# Patient Record
Sex: MALE | Race: WHITE | NOT HISPANIC OR LATINO | Employment: OTHER | URBAN - METROPOLITAN AREA
[De-identification: names, ages, dates, MRNs, and addresses within clinical notes are randomized per-mention and may not be internally consistent; named-entity substitution may affect disease eponyms.]

---

## 2024-11-11 ENCOUNTER — APPOINTMENT (OUTPATIENT)
Dept: RADIOLOGY | Facility: HOSPITAL | Age: 79
End: 2024-11-11
Payer: MEDICARE

## 2024-11-11 ENCOUNTER — HOSPITAL ENCOUNTER (EMERGENCY)
Facility: HOSPITAL | Age: 79
Discharge: HOME | End: 2024-11-12
Payer: MEDICARE
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
## 2024-11-11 DIAGNOSIS — S24.112A: ICD-10-CM

## 2024-11-11 DIAGNOSIS — S09.90XA CLOSED HEAD INJURY, INITIAL ENCOUNTER: ICD-10-CM

## 2024-11-11 PROCEDURE — 70450 CT HEAD/BRAIN W/O DYE: CPT | Performed by: RADIOLOGY

## 2024-11-11 PROCEDURE — 72125 CT NECK SPINE W/O DYE: CPT | Performed by: RADIOLOGY

## 2024-11-11 PROCEDURE — 2500000005 HC RX 250 GENERAL PHARMACY W/O HCPCS: Performed by: NURSE PRACTITIONER

## 2024-11-11 PROCEDURE — 72128 CT CHEST SPINE W/O DYE: CPT | Performed by: RADIOLOGY

## 2024-11-11 PROCEDURE — 70450 CT HEAD/BRAIN W/O DYE: CPT

## 2024-11-11 PROCEDURE — 2500000004 HC RX 250 GENERAL PHARMACY W/ HCPCS (ALT 636 FOR OP/ED): Performed by: NURSE PRACTITIONER

## 2024-11-11 PROCEDURE — 72131 CT LUMBAR SPINE W/O DYE: CPT

## 2024-11-11 PROCEDURE — 72125 CT NECK SPINE W/O DYE: CPT

## 2024-11-11 PROCEDURE — 99285 EMERGENCY DEPT VISIT HI MDM: CPT | Mod: 25

## 2024-11-11 PROCEDURE — 2500000001 HC RX 250 WO HCPCS SELF ADMINISTERED DRUGS (ALT 637 FOR MEDICARE OP): Performed by: NURSE PRACTITIONER

## 2024-11-11 PROCEDURE — 72128 CT CHEST SPINE W/O DYE: CPT

## 2024-11-11 PROCEDURE — 90471 IMMUNIZATION ADMIN: CPT | Performed by: NURSE PRACTITIONER

## 2024-11-11 PROCEDURE — 72131 CT LUMBAR SPINE W/O DYE: CPT | Performed by: RADIOLOGY

## 2024-11-11 PROCEDURE — 90715 TDAP VACCINE 7 YRS/> IM: CPT | Performed by: NURSE PRACTITIONER

## 2024-11-11 RX ORDER — OXYMETAZOLINE HCL 0.05 %
2 SPRAY, NON-AEROSOL (ML) NASAL ONCE
Status: COMPLETED | OUTPATIENT
Start: 2024-11-11 | End: 2024-11-11

## 2024-11-11 RX ORDER — ACETAMINOPHEN 325 MG/1
975 TABLET ORAL ONCE
Status: COMPLETED | OUTPATIENT
Start: 2024-11-11 | End: 2024-11-11

## 2024-11-11 RX ORDER — BACITRACIN ZINC 500 UNIT/G
OINTMENT IN PACKET (EA) TOPICAL ONCE
Status: COMPLETED | OUTPATIENT
Start: 2024-11-11 | End: 2024-11-11

## 2024-11-11 ASSESSMENT — LIFESTYLE VARIABLES
EVER FELT BAD OR GUILTY ABOUT YOUR DRINKING: NO
EVER HAD A DRINK FIRST THING IN THE MORNING TO STEADY YOUR NERVES TO GET RID OF A HANGOVER: NO
HAVE PEOPLE ANNOYED YOU BY CRITICIZING YOUR DRINKING: NO
HAVE YOU EVER FELT YOU SHOULD CUT DOWN ON YOUR DRINKING: NO
TOTAL SCORE: 0

## 2024-11-11 ASSESSMENT — COLUMBIA-SUICIDE SEVERITY RATING SCALE - C-SSRS
1. IN THE PAST MONTH, HAVE YOU WISHED YOU WERE DEAD OR WISHED YOU COULD GO TO SLEEP AND NOT WAKE UP?: NO
2. HAVE YOU ACTUALLY HAD ANY THOUGHTS OF KILLING YOURSELF?: NO
6. HAVE YOU EVER DONE ANYTHING, STARTED TO DO ANYTHING, OR PREPARED TO DO ANYTHING TO END YOUR LIFE?: NO

## 2024-11-11 ASSESSMENT — PAIN DESCRIPTION - FREQUENCY: FREQUENCY: CONSTANT/CONTINUOUS

## 2024-11-11 ASSESSMENT — PAIN DESCRIPTION - DESCRIPTORS
DESCRIPTORS_2: ACHING
DESCRIPTORS: HEADACHE

## 2024-11-11 ASSESSMENT — PAIN DESCRIPTION - PAIN TYPE: TYPE: ACUTE PAIN

## 2024-11-11 ASSESSMENT — PAIN - FUNCTIONAL ASSESSMENT: PAIN_FUNCTIONAL_ASSESSMENT: 0-10

## 2024-11-11 ASSESSMENT — PAIN SCALES - GENERAL
PAINLEVEL_OUTOF10: 2
PAINLEVEL_OUTOF10: 2

## 2024-11-11 ASSESSMENT — PAIN DESCRIPTION - LOCATION
LOCATION: HEAD
LOCATION_2: NECK

## 2024-11-12 ENCOUNTER — TELEPHONE (OUTPATIENT)
Dept: HEMATOLOGY/ONCOLOGY | Facility: HOSPITAL | Age: 79
End: 2024-11-12
Payer: MEDICARE

## 2024-11-12 VITALS
WEIGHT: 220 LBS | RESPIRATION RATE: 18 BRPM | DIASTOLIC BLOOD PRESSURE: 83 MMHG | HEART RATE: 93 BPM | SYSTOLIC BLOOD PRESSURE: 132 MMHG | HEIGHT: 71 IN | BODY MASS INDEX: 30.8 KG/M2 | OXYGEN SATURATION: 97 % | TEMPERATURE: 97.3 F

## 2024-11-12 NOTE — ED PROVIDER NOTES
HPI   Chief Complaint   Patient presents with    Fall     Pt tripped and fell from standing position hitting back of head  no LOC  on ASA only then something fell and hit his forehead small laceration bleeding controlled some neck pain placed in c-collar by lois        79-year-old male with past history of hypertension, diabetes presents the emergency department today for a fall with head injury.  Patient states earlier today he was c ambulating out in the garage when he tripped over a board and fell striking his head on the floor.  No syncope.  No chest pain or shortness of breath.  Did not lose consciousness.  Is not on any blood thinners.  Does take a baby aspirin once a day.  Did not take anything prior to arrival for his discomfort.  He is having some neck and upper back pain.  Denies any abdominal pain lower upper extremity discomfort.  No vision changes, nausea or vomiting.  No dizziness or.  He does have a slight headache with the neck pain.  No saddle anesthesia bowel or bladder incontinence.  Unsure when his last tetanus vaccination was.                          Carlos Coma Scale Score: 15                  Patient History   History reviewed. No pertinent past medical history.  History reviewed. No pertinent surgical history.  No family history on file.  Social History     Tobacco Use    Smoking status: Never    Smokeless tobacco: Never   Vaping Use    Vaping status: Never Used   Substance Use Topics    Alcohol use: Not Currently    Drug use: Never       Physical Exam   ED Triage Vitals [11/11/24 2004]   Temperature Heart Rate Respirations BP   36.3 °C (97.3 °F) 59 20 141/76      Pulse Ox Temp Source Heart Rate Source Patient Position   95 % Tympanic Monitor Sitting      BP Location FiO2 (%)     Left arm --       Physical Exam  Vitals and nursing note reviewed.   Constitutional:       General: He is not in acute distress.     Appearance: Normal appearance. He is not toxic-appearing.   HENT:      Head: No  raccoon eyes or Velarde's sign.        Comments: Small abrasion over the right eye with no active bleeding at this time.  Cleansed in the ED.  Bacitracin applied.     Right Ear: Tympanic membrane normal. No hemotympanum.      Left Ear: Tympanic membrane normal. No hemotympanum.      Mouth/Throat:      Mouth: Mucous membranes are moist.      Pharynx: Oropharynx is clear.   Eyes:      Extraocular Movements: Extraocular movements intact.      Pupils: Pupils are equal, round, and reactive to light.   Cardiovascular:      Rate and Rhythm: Normal rate and regular rhythm.      Pulses: Normal pulses.      Heart sounds: Normal heart sounds.   Pulmonary:      Effort: Pulmonary effort is normal.      Breath sounds: Normal breath sounds.   Abdominal:      General: Abdomen is flat. Bowel sounds are normal.      Palpations: Abdomen is soft.      Tenderness: There is no abdominal tenderness.   Musculoskeletal:         General: Normal range of motion.      Cervical back: Normal range of motion and neck supple. Bony tenderness present.      Thoracic back: Bony tenderness present.      Lumbar back: Normal.      Right lower leg: No edema.      Left lower leg: No edema.   Skin:     General: Skin is warm and dry.      Capillary Refill: Capillary refill takes less than 2 seconds.   Neurological:      General: No focal deficit present.      Mental Status: He is alert and oriented to person, place, and time.      GCS: GCS eye subscore is 4. GCS verbal subscore is 5. GCS motor subscore is 6.      Cranial Nerves: No cranial nerve deficit.      Sensory: No sensory deficit.      Motor: No weakness.      Coordination: Coordination is intact.      Gait: Gait is intact.      Deep Tendon Reflexes: Reflexes are normal and symmetric.   Psychiatric:         Mood and Affect: Mood normal.         Behavior: Behavior normal.         Judgment: Judgment normal.         Labs Reviewed - No data to display  Pain Management Panel           No data to display               CT head wo IV contrast   Final Result   No evidence of acute intracranial hemorrhage or depressed calvarial   fracture.        Cerebral atrophy and chronic periventricular white matter small   vessel ischemic change.        Chronic mucosal opacification of left frontal sinus, left ethmoid air   cells and left maxillary and sphenoid sinus.             MACRO:   None        Signed by: Hiren Jimenez 11/11/2024 10:26 PM   Dictation workstation:   [id number]      CT cervical spine wo IV contrast   Final Result   No evidence of acute fracture of the cervical spine.        Multilevel degenerative change of the cervical spine.        Straightening and mild reversal of the normal cervical lordosis which   may be secondary to patient positioning or muscle spasm        MACRO:   None        Signed by: Hiren Jimenez 11/11/2024 10:30 PM   Dictation workstation:   [id number]      CT thoracic spine wo IV contrast   Final Result   No acute fracture of the thoracic or lumbar spine.        Multilevel degenerative change of the thoracic and lumbar spine.        Nonspecific sclerotic lesion T5 and left 7th rib; clinical   correlation recommended if there is concern for metastatic disease in   the appropriate clinical setting.             MACRO:   None        Signed by: Hiren Jimenez 11/11/2024 10:42 PM   Dictation workstation:   [id number]      CT lumbar spine wo IV contrast   Final Result   No acute fracture of the thoracic or lumbar spine.        Multilevel degenerative change of the thoracic and lumbar spine.        Nonspecific sclerotic lesion T5 and left 7th rib; clinical   correlation recommended if there is concern for metastatic disease in   the appropriate clinical setting.             MACRO:   None        Signed by: Hiren Jimenez 11/11/2024 10:42 PM   Dictation workstation:   [id number]          ED Course & MDM   ED Course as of 11/11/24 2300   Mon Nov 11, 2024   2100 Patient is requesting some Afrin for his nasal  congestion which is chronic in nature [RB]      ED Course User Index  [RB] Anel Jaimes, APRN-CNP         Diagnoses as of 11/11/24 2300   Fall, initial encounter   Complete lesion at T5 level of thoracic spinal cord (Multi)   Closed head injury, initial encounter       Medical Decision Making  On initial valuation patient is well-appearing emergency department at this time.  Does have a small abrasion to the right frontal scalp.  Did cleanse it and bacitracin Dr. Shook dressing applied.  He was given Tylenol with improvement in his pain.  Patient has been maintained in a c-collar from the squad.  Was updated on his Boostrix.  CT of the head C-spine T-spine and L-spine are ordered.  No evidence of acute intracranial hemorrhage or depressed R fracture.  CT C-spine shows no evidence of acute fracture or cervical spine does have degenerative changes.  Thoracic spine shows no acute fracture of the thoracic or lumbar spine does have a sclerotic lesion of T5 and left seventh rib.  Patient is unaware of these results from the past.  Will set him up for diagnostic clinic for further evaluation however states he is feeling better at this time and would like to go home.  He is ambulatory at his baseline ambulation.  We are attempting to find patient a ride home at this time as his wife does not drive at night and he came by squad.  Nursing staff is aware.  I discussed return precautions outpatient follow-up with the patient he verbalized understanding of the plan has no further questions or concerns he will be discharged home in improved condition.        Procedure  Procedures      I discussed the differential, results and discharge plan with the patient and/or family/friend/caregiver if present.  I emphasized the importance of follow-up with the physician I referred them to in the timeframe recommended.  I explained reasons for the patient to return to the Emergency Department. Additional verbal discharge instructions  were also given and discussed with the patient to supplement those generated by the EMR. We also discussed medications that were prescribed (if any) including common side effects and interactions. The patient was advised to abstain from driving, operating heavy machinery or making significant decisions while taking medications such as opiates and muscle relaxers that may impair this. All questions were addressed.  They understand return precautions and discharge instructions. The patient and/or family/friend/caregiver expressed understanding.           Anel Jaimes, JOHN-KAITLYN  11/11/24 [id number]

## 2024-11-12 NOTE — TELEPHONE ENCOUNTER
Referral placed to [facility] by Anel Jaimes NP, [facility], for sclerotic osseous lesions, discovered incidentally on CT imaging yesterday. I called the patient to discuss his imaging results and the referral. No answer, I left  requesting return call to office. Diagnostic Clinic contact information provided.   JOHN Poole.KAITLYN  [facility]

## 2024-11-13 NOTE — TELEPHONE ENCOUNTER
11/13/2024 @ 1326:  Called patient in response to a message from our [facility] Scheduling Team.  Patient had called our [facility] Scheduling Team today, to schedule an appointment with our [facility], but requested to be scheduled at a location closer to home.  Voicemail message left, requesting call back to discuss patient's scheduling preferences and appointment options; our contact information was provided.    Update sent to JOHN Poole-KAITLYN.    Veena Bell RN  [facility] - RN Care Coordinator  Office:  [phone]

## 2024-11-18 ENCOUNTER — TELEMEDICINE (OUTPATIENT)
Dept: HEMATOLOGY/ONCOLOGY | Facility: HOSPITAL | Age: 79
End: 2024-11-18
Payer: MEDICARE

## 2024-11-18 DIAGNOSIS — M89.9 LYTIC LESION OF BONE ON X-RAY: Primary | ICD-10-CM

## 2024-11-18 DIAGNOSIS — R93.7 ABNORMAL FINDINGS ON DIAGNOSTIC IMAGING OF OTHER PARTS OF MUSCULOSKELETAL SYSTEM: ICD-10-CM

## 2024-11-18 PROCEDURE — 1036F TOBACCO NON-USER: CPT | Performed by: NURSE PRACTITIONER

## 2024-11-18 PROCEDURE — 99205 OFFICE O/P NEW HI 60 MIN: CPT | Performed by: NURSE PRACTITIONER

## 2024-11-18 RX ORDER — DIAZEPAM 2 MG/1
2 TABLET ORAL AS NEEDED
COMMUNITY

## 2024-11-18 RX ORDER — BUMETANIDE 0.5 MG/1
0.5 TABLET ORAL DAILY
COMMUNITY
Start: 2024-11-01

## 2024-11-18 RX ORDER — FAMOTIDINE 20 MG/1
20 TABLET, FILM COATED ORAL DAILY
COMMUNITY

## 2024-11-18 RX ORDER — PROPRANOLOL HYDROCHLORIDE 120 MG/1
120 CAPSULE, EXTENDED RELEASE ORAL DAILY
COMMUNITY

## 2024-11-18 RX ORDER — IPRATROPIUM BROMIDE 42 UG/1
2 SPRAY, METERED NASAL 3 TIMES DAILY
COMMUNITY
Start: 2024-04-28

## 2024-11-18 RX ORDER — CLINDAMYCIN HYDROCHLORIDE 150 MG/1
150 CAPSULE ORAL DAILY PRN
COMMUNITY
Start: 2024-01-30

## 2024-11-18 RX ORDER — ASPIRIN 325 MG
325 TABLET ORAL DAILY
COMMUNITY

## 2024-11-18 RX ORDER — LANOLIN ALCOHOL/MO/W.PET/CERES
500 CREAM (GRAM) TOPICAL NIGHTLY
COMMUNITY
Start: 2024-09-05

## 2024-11-18 RX ORDER — CHOLECALCIFEROL (VITAMIN D3) 25 MCG
1000 TABLET ORAL DAILY
COMMUNITY

## 2024-11-18 RX ORDER — LISINOPRIL 5 MG/1
5 TABLET ORAL DAILY
COMMUNITY

## 2024-11-18 RX ORDER — LOVASTATIN 40 MG/1
40 TABLET ORAL NIGHTLY
COMMUNITY
Start: 2024-11-01

## 2024-11-18 ASSESSMENT — ENCOUNTER SYMPTOMS
ABDOMINAL PAIN: 0
ENDOCRINE COMMENTS: DENIES NIGHT SWEATS
VOMITING: 0
SPEECH DIFFICULTY: 0
HEMATURIA: 0
BLOOD IN STOOL: 0
DIARRHEA: 0
LEG SWELLING: 1
SHORTNESS OF BREATH: 0
APPETITE CHANGE: 0
ARTHRALGIAS: 0
NAUSEA: 0
CHILLS: 0
COUGH: 1
TROUBLE SWALLOWING: 0
FATIGUE: 0
UNEXPECTED WEIGHT CHANGE: 0
FEVER: 0

## 2024-11-18 NOTE — PROGRESS NOTES
"[facility]  Diagnostic Clinic  New Patient Virtual Visit    Date of Service: 24      Patient Name: King Duvall   : 1945  MRN: [id number]   PCP: Kris Christianson MD   Referred by: Anel Jaimes NP    Problem: Sclerotic osseous lesions    HPI: King Duvall is a 79 y.o. year old male w/ PMH HTN, DM2, HPL, GERD, CHF, COPD, enlarged prostate & hydrocele who presents to [facility] with sclerotic bone lesions of T5 and left seventh rib, referral placed by Anel Jaimes NP, [place].     Mr. Duvall presented to  [facility] on 24 after falling in his garage and sustaining a head injury. He sustained a small abrasion to his right frontal scalp. CT imaging revealed no e/o of acute ICH or fracture, but CT thoracic spine did reveal nonspecific sclerotic lesion in T5 and in left 7th rib posteriorly. He was discharged home, to follow-up in the diagnostic clinic for outpatient work-up.    Mr. Duvall presents virtually via video to clinic today. He notes feeling overall well & in his usual state of health. He denies complaints today. He specifically denies fevers/chills, night sweats, unintentional weight loss, fatigue, and SHARMIN. Never smoker. No prior history of cancer. He follows w/ PCP Dr. Christianson locally in [place], not affiliated with . He denies recent PSA or colonoscopy.    History of tobacco use:   Never.  Denies ETOH or illicit drug use.    Personal history of malignancy:   None.    Family history of malignancy:   Brother - leukemia.    Health Maintenance:  Colonoscopy \"many years ago\", reportedly benign per pt.  No recent PSA test per pt.    PATHOLOGY:  N/A     IMAGING:    === 24 ===    CT LUMBAR SPINE WO IV CONTRAST    - Impression -  No acute fracture of the thoracic or lumbar spine.    Multilevel degenerative change of the thoracic and lumbar spine.    Nonspecific sclerotic lesion T5 and left 7th rib; clinical  correlation recommended if there is " concern for metastatic disease in  the appropriate clinical setting.      MACRO:  None    Signed by: Hiren Jimenez 11/11/2024 10:42 PM  Dictation workstation:   [id number]      LABS:  No prior lab work on file.    PAST MEDICAL HISTORY:  Past Medical History:   Diagnosis Date    Allergic     CHF (congestive heart failure)     COPD (chronic obstructive pulmonary disease) (Multi)     Coronary artery disease     Diabetes mellitus (Multi)     GERD (gastroesophageal reflux disease)     Hypercholesteremia     Hypertension        PAST SURGICAL HISTORY:  History reviewed. No pertinent surgical history.    SOCIAL HISTORY:  Social Connections: Not on file       FAMILY HISTORY:  Family History   Problem Relation Name Age of Onset    Leukemia Brother         MEDICATIONS:  Current Outpatient Medications on File Prior to Visit   Medication Sig Dispense Refill    bumetanide (Bumex) 0.5 mg tablet Take 1 tablet (0.5 mg) by mouth once daily. Takes 2 tabs orally on Thursday & Sunday.      clindamycin (Cleocin) 150 mg capsule Take 1 capsule (150 mg) by mouth once daily as needed (Take 4 tablets by mouth one hour prior to dental work.).      ipratropium (Atrovent) 42 mcg (0.06 %) nasal spray Administer 2 sprays into each nostril 3 times a day.      lovastatin (Mevacor) 40 mg tablet Take 1 tablet (40 mg) by mouth once daily at bedtime.      niacin 500 mg ER tablet Take 1 tablet (500 mg) by mouth once daily at bedtime.      aspirin 325 mg tablet Take 1 tablet (325 mg) by mouth once daily.      cholecalciferol (Vitamin D-3) 25 MCG (1000 UT) tablet Take 1 tablet (1,000 Units) by mouth once daily.      diazePAM (Valium) 2 mg tablet Take 1 tablet (2 mg) by mouth if needed. Take as directed      famotidine (Pepcid) 20 mg tablet Take 1 tablet (20 mg) by mouth once daily.      lisinopril 5 mg tablet Take 1 tablet (5 mg) by mouth once daily.      propranolol LA (Inderal LA) 120 mg 24 hr capsule Take 1 capsule (120 mg) by mouth once daily.        No current facility-administered medications on file prior to visit.         REVIEW OF SYSTEMS:  Review of Systems   Constitutional:  Negative for appetite change, chills, fatigue, fever and unexpected weight change.        +has intentionally lost >100 lbs since 2007   HENT:   Negative for trouble swallowing.    Respiratory:  Positive for cough. Negative for shortness of breath.         +nocturnal cough   Cardiovascular:  Positive for leg swelling. Negative for chest pain.        +chronic BLE edema, on diuretic   Gastrointestinal:  Negative for abdominal pain, blood in stool, diarrhea, nausea and vomiting.   Endocrine:        Denies night sweats   Genitourinary:  Negative for hematuria.    Musculoskeletal:  Negative for arthralgias and gait problem.   Neurological:  Negative for gait problem and speech difficulty.       OBJECTIVE:  There were no vitals taken for this visit.  Physical exam deferred due to virtual visit.    ASSESSMENT:  King Duvall is a 79 y.o. year old male w/ PMH HTN, DM2, HPL, GERD, CHF, COPD, enlarged prostate & hydrocele who presents to [facility] with sclerotic bone lesions of T5 and left seventh rib, of uncertain etiology.    PLAN:  1. Lytic lesion of bone on x-ray (Primary)  Unclear etiology, r/o underlying malignancy.  -- Lab work ordered, including PSA and MM labs.  -- I will call Mr. Duvall to review his lab results & arrange next steps.  -- All patient questions answered.    JOHN Poole.CNP  [facility]           Virtual or Telephone Consent    A telephone visit (audio only) between the patient (at the originating site) and the provider (at the distant site) was utilized to provide this telehealth service.   Verbal consent was requested and obtained from King Duvall on this date, 11/18/24 for a telehealth visit.

## 2024-11-21 ENCOUNTER — LAB (OUTPATIENT)
Dept: LAB | Facility: LAB | Age: 79
End: 2024-11-21
Payer: MEDICARE

## 2024-11-21 DIAGNOSIS — M89.9 LYTIC LESION OF BONE ON X-RAY: ICD-10-CM

## 2024-11-21 DIAGNOSIS — R93.7 ABNORMAL FINDINGS ON DIAGNOSTIC IMAGING OF OTHER PARTS OF MUSCULOSKELETAL SYSTEM: ICD-10-CM

## 2024-11-21 LAB
ALBUMIN SERPL BCP-MCNC: 3.9 G/DL (ref 3.4–5)
ALP SERPL-CCNC: 69 U/L (ref 33–136)
ALT SERPL W P-5'-P-CCNC: 16 U/L (ref 10–52)
ANION GAP SERPL CALC-SCNC: 8 MMOL/L (ref 10–20)
AST SERPL W P-5'-P-CCNC: 17 U/L (ref 9–39)
B2 MICROGLOB SERPL-MCNC: 4.5 MG/L (ref 0.7–2.2)
BASOPHILS # BLD AUTO: 0.06 X10*3/UL (ref 0–0.1)
BASOPHILS NFR BLD AUTO: 1.1 %
BILIRUB SERPL-MCNC: 1.1 MG/DL (ref 0–1.2)
BUN SERPL-MCNC: 15 MG/DL (ref 6–23)
CALCIUM SERPL-MCNC: 8.9 MG/DL (ref 8.6–10.3)
CHLORIDE SERPL-SCNC: 97 MMOL/L (ref 98–107)
CO2 SERPL-SCNC: 37 MMOL/L (ref 21–32)
CREAT SERPL-MCNC: 0.67 MG/DL (ref 0.5–1.3)
EGFRCR SERPLBLD CKD-EPI 2021: >90 ML/MIN/1.73M*2
EOSINOPHIL # BLD AUTO: 0.31 X10*3/UL (ref 0–0.4)
EOSINOPHIL NFR BLD AUTO: 5.7 %
ERYTHROCYTE [DISTWIDTH] IN BLOOD BY AUTOMATED COUNT: 13.3 % (ref 11.5–14.5)
GLUCOSE SERPL-MCNC: 105 MG/DL (ref 74–99)
HCT VFR BLD AUTO: 41.3 % (ref 41–52)
HGB BLD-MCNC: 13.1 G/DL (ref 13.5–17.5)
IGA SERPL-MCNC: 250 MG/DL (ref 70–400)
IGG SERPL-MCNC: 870 MG/DL (ref 700–1600)
IGM SERPL-MCNC: 42 MG/DL (ref 40–230)
IMM GRANULOCYTES # BLD AUTO: 0.02 X10*3/UL (ref 0–0.5)
IMM GRANULOCYTES NFR BLD AUTO: 0.4 % (ref 0–0.9)
LDH SERPL L TO P-CCNC: 199 U/L (ref 84–246)
LYMPHOCYTES # BLD AUTO: 0.68 X10*3/UL (ref 0.8–3)
LYMPHOCYTES NFR BLD AUTO: 12.5 %
MCH RBC QN AUTO: 31.4 PG (ref 26–34)
MCHC RBC AUTO-ENTMCNC: 31.7 G/DL (ref 32–36)
MCV RBC AUTO: 99 FL (ref 80–100)
MONOCYTES # BLD AUTO: 0.39 X10*3/UL (ref 0.05–0.8)
MONOCYTES NFR BLD AUTO: 7.2 %
NEUTROPHILS # BLD AUTO: 3.99 X10*3/UL (ref 1.6–5.5)
NEUTROPHILS NFR BLD AUTO: 73.1 %
NRBC BLD-RTO: 0 /100 WBCS (ref 0–0)
PLATELET # BLD AUTO: 170 X10*3/UL (ref 150–450)
POTASSIUM SERPL-SCNC: 4.4 MMOL/L (ref 3.5–5.3)
PROT SERPL-MCNC: 6 G/DL (ref 6.4–8.2)
PROT SERPL-MCNC: 6.1 G/DL (ref 6.4–8.2)
PSA SERPL-MCNC: 2.66 NG/ML
RBC # BLD AUTO: 4.17 X10*6/UL (ref 4.5–5.9)
SODIUM SERPL-SCNC: 138 MMOL/L (ref 136–145)
WBC # BLD AUTO: 5.5 X10*3/UL (ref 4.4–11.3)

## 2024-11-21 PROCEDURE — 82232 ASSAY OF BETA-2 PROTEIN: CPT

## 2024-11-21 PROCEDURE — 83521 IG LIGHT CHAINS FREE EACH: CPT

## 2024-11-21 PROCEDURE — 84153 ASSAY OF PSA TOTAL: CPT

## 2024-11-21 PROCEDURE — 36415 COLL VENOUS BLD VENIPUNCTURE: CPT

## 2024-11-21 PROCEDURE — 85025 COMPLETE CBC W/AUTO DIFF WBC: CPT

## 2024-11-21 PROCEDURE — 84165 PROTEIN E-PHORESIS SERUM: CPT

## 2024-11-21 PROCEDURE — 84165 PROTEIN E-PHORESIS SERUM: CPT | Performed by: NURSE PRACTITIONER

## 2024-11-21 PROCEDURE — 83615 LACTATE (LD) (LDH) ENZYME: CPT

## 2024-11-21 PROCEDURE — 84155 ASSAY OF PROTEIN SERUM: CPT

## 2024-11-21 PROCEDURE — 82784 ASSAY IGA/IGD/IGG/IGM EACH: CPT

## 2024-11-21 PROCEDURE — 80053 COMPREHEN METABOLIC PANEL: CPT

## 2024-11-22 LAB
ALBUMIN: 3.6 G/DL (ref 3.4–5)
ALPHA 1 GLOBULIN: 0.3 G/DL (ref 0.2–0.6)
ALPHA 2 GLOBULIN: 0.7 G/DL (ref 0.4–1.1)
BETA GLOBULIN: 0.6 G/DL (ref 0.5–1.2)
GAMMA GLOBULIN: 0.8 G/DL (ref 0.5–1.4)
KAPPA LC SERPL-MCNC: 2.55 MG/DL (ref 0.33–1.94)
KAPPA LC/LAMBDA SER: 1.02 {RATIO} (ref 0.26–1.65)
LAMBDA LC SERPL-MCNC: 2.5 MG/DL (ref 0.57–2.63)
PATH REVIEW-SERUM PROTEIN ELECTROPHORESIS: NORMAL
PROTEIN ELECTROPHORESIS COMMENT: NORMAL

## 2024-12-03 ENCOUNTER — TELEPHONE (OUTPATIENT)
Dept: HEMATOLOGY/ONCOLOGY | Facility: HOSPITAL | Age: 79
End: 2024-12-03
Payer: MEDICARE

## 2024-12-03 DIAGNOSIS — M89.9 LYTIC LESION OF BONE ON X-RAY: Primary | ICD-10-CM

## 2024-12-03 DIAGNOSIS — R50.9 FEVER, UNSPECIFIED: ICD-10-CM

## 2024-12-03 NOTE — TELEPHONE ENCOUNTER
I called Mr. Duvall to review his lab results & low suspicion for MM as the etiology of his lytic bone lesions. We discussed PET/CT scan as next step; he is agreeable. His wife and daughter are both having surgery this month & he is requesting to wait until January for this imaging. I placed the order & asked the scheduling team to reach out to him to assist w/ appt. Discussed that I will call him to review the results; he has my contact info if any questions/issues in the interim.  JOHN Poole.CNP  [facility]

## 2025-01-24 ENCOUNTER — HOSPITAL ENCOUNTER (OUTPATIENT)
Dept: RADIOLOGY | Facility: HOSPITAL | Age: 80
Discharge: HOME | End: 2025-01-24
Payer: MEDICARE

## 2025-01-24 DIAGNOSIS — R50.9 FEVER, UNSPECIFIED: ICD-10-CM

## 2025-01-24 DIAGNOSIS — M89.9 LYTIC LESION OF BONE ON X-RAY: ICD-10-CM

## 2025-01-24 PROCEDURE — 3430000001 HC RX 343 DIAGNOSTIC RADIOPHARMACEUTICALS: Performed by: NURSE PRACTITIONER

## 2025-01-24 PROCEDURE — 78815 PET IMAGE W/CT SKULL-THIGH: CPT | Mod: PI

## 2025-01-24 PROCEDURE — A9552 F18 FDG: HCPCS | Performed by: NURSE PRACTITIONER

## 2025-01-24 RX ORDER — FLUDEOXYGLUCOSE F 18 200 MCI/ML
12 INJECTION, SOLUTION INTRAVENOUS
Status: COMPLETED | OUTPATIENT
Start: 2025-01-24 | End: 2025-01-24

## 2025-01-24 RX ADMIN — FLUDEOXYGLUCOSE F 18 12 MILLICURIE: 200 INJECTION, SOLUTION INTRAVENOUS at 12:55

## 2025-01-29 ENCOUNTER — TELEPHONE (OUTPATIENT)
Dept: HEMATOLOGY/ONCOLOGY | Facility: HOSPITAL | Age: 80
End: 2025-01-29
Payer: MEDICARE

## 2025-01-29 DIAGNOSIS — N43.3 HYDROCELE, UNSPECIFIED HYDROCELE TYPE: ICD-10-CM

## 2025-01-29 DIAGNOSIS — N50.9 SCROTAL LESION: Primary | ICD-10-CM

## 2025-01-29 NOTE — TELEPHONE ENCOUNTER
I called Mr. Duvall to review the results of his PET scan, to evaluate his T5 vertebral body and left 7th rib sclerotic lesions. We discussed that these bone lesions are not avid and are probably bone islands, which is a benign finding. His lab work was not suggestive of MM either. The PET scan did show an avid irregular soft tissue density along the lateral aspect of his right hydrocele. We discussed a scrotal US as next step, to exclude primary testicular neoplasm, and he is agreeable. Order placed; I asked the [facility] schedulers to reach out to him to assist w/ an appt. He has my contact info if any questions/issues in the interim.   JOHN Poole.CNP  [facility]

## 2025-02-06 ENCOUNTER — APPOINTMENT (OUTPATIENT)
Dept: RADIOLOGY | Facility: HOSPITAL | Age: 80
End: 2025-02-06
Payer: MEDICARE

## 2025-02-07 ENCOUNTER — HOSPITAL ENCOUNTER (OUTPATIENT)
Dept: RADIOLOGY | Facility: HOSPITAL | Age: 80
Discharge: HOME | End: 2025-02-07
Payer: MEDICARE

## 2025-02-07 DIAGNOSIS — N50.9 SCROTAL LESION: ICD-10-CM

## 2025-02-07 DIAGNOSIS — N43.3 HYDROCELE, UNSPECIFIED HYDROCELE TYPE: ICD-10-CM

## 2025-02-07 PROCEDURE — 76870 US EXAM SCROTUM: CPT

## 2025-02-18 ENCOUNTER — TELEPHONE (OUTPATIENT)
Dept: HEMATOLOGY/ONCOLOGY | Facility: HOSPITAL | Age: 80
End: 2025-02-18
Payer: MEDICARE

## 2025-02-18 NOTE — TELEPHONE ENCOUNTER
I called Mr. Duvall to review his US scrotum results, which was ordered to evaluate the avid ST density along the lateral aspect of his right hydrocele. The US shows a 12 mm heterogenous partially calcified nodule in the right scrotal wall, likely corresponding to the avid area on his PET. He is established w/ Dr. Hayden, urologist at [facility], and is agreeable to scheduling an appt w/ him for further evaluation of this finding, w/ follow-up to assess stability. We will transfer his imaging to [facility] for their review at the appt. All questions answered & Mr Duvall agrees w/ the plan. He has my contact info if any questions/concerns arise in the interim.  Tika Zimmerman, APRN-CNP